# Patient Record
Sex: FEMALE | Race: WHITE | NOT HISPANIC OR LATINO
[De-identification: names, ages, dates, MRNs, and addresses within clinical notes are randomized per-mention and may not be internally consistent; named-entity substitution may affect disease eponyms.]

---

## 2018-10-29 ENCOUNTER — FORM ENCOUNTER (OUTPATIENT)
Age: 33
End: 2018-10-29

## 2019-04-14 ENCOUNTER — FORM ENCOUNTER (OUTPATIENT)
Age: 34
End: 2019-04-14

## 2019-11-07 ENCOUNTER — FORM ENCOUNTER (OUTPATIENT)
Age: 34
End: 2019-11-07

## 2020-11-09 ENCOUNTER — FORM ENCOUNTER (OUTPATIENT)
Age: 35
End: 2020-11-09

## 2021-05-03 ENCOUNTER — EMERGENCY (EMERGENCY)
Facility: HOSPITAL | Age: 36
LOS: 0 days | Discharge: HOME | End: 2021-05-03
Attending: EMERGENCY MEDICINE
Payer: COMMERCIAL

## 2021-05-03 VITALS
TEMPERATURE: 99 F | HEART RATE: 81 BPM | SYSTOLIC BLOOD PRESSURE: 146 MMHG | DIASTOLIC BLOOD PRESSURE: 96 MMHG | OXYGEN SATURATION: 100 % | RESPIRATION RATE: 18 BRPM

## 2021-05-03 VITALS — WEIGHT: 130.07 LBS | HEIGHT: 63 IN

## 2021-05-03 DIAGNOSIS — S66.221A LACERATION OF EXTENSOR MUSCLE, FASCIA AND TENDON OF RIGHT THUMB AT WRIST AND HAND LEVEL, INITIAL ENCOUNTER: ICD-10-CM
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
## 2021-05-03 PROCEDURE — 99285 EMERGENCY DEPT VISIT HI MDM: CPT | Mod: 25

## 2021-05-03 PROCEDURE — 26418 REPAIR FINGER TENDON: CPT

## 2021-05-03 PROCEDURE — 99284 EMERGENCY DEPT VISIT MOD MDM: CPT

## 2021-05-03 RX ORDER — CEPHALEXIN 500 MG
1 CAPSULE ORAL
Qty: 20 | Refills: 0
Start: 2021-05-03 | End: 2021-05-07

## 2021-05-03 RX ORDER — CEPHALEXIN 500 MG
500 CAPSULE ORAL ONCE
Refills: 0 | Status: COMPLETED | OUTPATIENT
Start: 2021-05-03 | End: 2021-05-03

## 2021-05-03 RX ADMIN — Medication 500 MILLIGRAM(S): at 19:32

## 2021-05-03 NOTE — ED STATDOCS - PROGRESS NOTE DETAILS
laceration repaired by Dr. Hicks at patient request.  She will follow up in the office -Clifford Jackson PA-C

## 2021-05-03 NOTE — ED STATDOCS - OBJECTIVE STATEMENT
36 y/o F with PMHx of ADHD on adarall presenting to the ED c/o R hand laceration x1 hour PTA. Patient was washing dishes when she cut her hand on a dish. Pt is RHD. Patient was seen at , sent patient to the ED to meet with Dr. Hicks. Tetanus shot is UTD. No other complaints at this time.

## 2021-05-03 NOTE — ED ADULT NURSE NOTE - OBJECTIVE STATEMENT
Pt comes to the ED complaining of laceration to right thumb. Pt sates that she was doing the dishes when she cut her hand.

## 2021-05-03 NOTE — ED STATDOCS - SKIN, MLM
skin normal color for race, warm, dry. +semicircular shaped laceration to R thumb around MCP 3cm in length.

## 2021-05-03 NOTE — ED STATDOCS - CARE PROVIDER_API CALL
Andrew Owens)  Plastic Surgery  [address], [address], [place] [address]  Phone: [phone]  Fax: [phone]  Follow Up Time:

## 2021-05-03 NOTE — ED STATDOCS - PATIENT PORTAL LINK FT
You can access the FollowMyHealth Patient Portal offered by [facility] by registering at the following website: [email or web address]. By joining [facility]’s FollowMyHealth portal, you will also be able to view your health information using other applications (apps) compatible with our system.

## 2021-10-18 PROBLEM — Z78.9 OTHER SPECIFIED HEALTH STATUS: Chronic | Status: ACTIVE | Noted: 2021-05-04

## 2021-11-01 ENCOUNTER — NON-APPOINTMENT (OUTPATIENT)
Age: 36
End: 2021-11-01

## 2021-11-01 DIAGNOSIS — R53.83 OTHER FATIGUE: ICD-10-CM

## 2021-11-01 DIAGNOSIS — R87.612 LOW GRADE SQUAMOUS INTRAEPITHELIAL LESION ON CYTOLOGIC SMEAR OF CERVIX (LGSIL): ICD-10-CM

## 2021-11-01 DIAGNOSIS — N76.0 ACUTE VAGINITIS: ICD-10-CM

## 2021-11-01 DIAGNOSIS — R87.810 CERVICAL HIGH RISK HUMAN PAPILLOMAVIRUS (HPV) DNA TEST POSITIVE: ICD-10-CM

## 2021-11-01 PROBLEM — Z00.00 ENCOUNTER FOR PREVENTIVE HEALTH EXAMINATION: Status: ACTIVE | Noted: 2021-11-01

## 2021-11-01 RX ORDER — DEXTROAMPHETAMINE SACCHARATE, AMPHETAMINE ASPARTATE, DEXTROAMPHETAMINE SULFATE, AND AMPHETAMINE SULFATE 7.5; 7.5; 7.5; 7.5 MG/1; MG/1; MG/1; MG/1
30 TABLET ORAL
Refills: 0 | Status: ACTIVE | COMMUNITY

## 2021-11-16 ENCOUNTER — APPOINTMENT (OUTPATIENT)
Dept: OBGYN | Facility: CLINIC | Age: 36
End: 2021-11-16

## 2022-11-15 ENCOUNTER — LABORATORY RESULT (OUTPATIENT)
Age: 37
End: 2022-11-15

## 2022-11-15 ENCOUNTER — APPOINTMENT (OUTPATIENT)
Dept: OBGYN | Facility: CLINIC | Age: 37
End: 2022-11-15

## 2022-11-15 ENCOUNTER — NON-APPOINTMENT (OUTPATIENT)
Age: 37
End: 2022-11-15

## 2022-11-15 VITALS
SYSTOLIC BLOOD PRESSURE: 122 MMHG | HEIGHT: 63 IN | DIASTOLIC BLOOD PRESSURE: 68 MMHG | WEIGHT: 130 LBS | BODY MASS INDEX: 23.04 KG/M2

## 2022-11-15 DIAGNOSIS — Z11.3 ENCOUNTER FOR SCREENING FOR INFECTIONS WITH A PREDOMINANTLY SEXUAL MODE OF TRANSMISSION: ICD-10-CM

## 2022-11-15 DIAGNOSIS — Z12.39 ENCOUNTER FOR OTHER SCREENING FOR MALIGNANT NEOPLASM OF BREAST: ICD-10-CM

## 2022-11-15 DIAGNOSIS — Z12.4 ENCOUNTER FOR SCREENING FOR MALIGNANT NEOPLASM OF CERVIX: ICD-10-CM

## 2022-11-15 PROCEDURE — 99395 PREV VISIT EST AGE 18-39: CPT

## 2022-11-15 NOTE — HISTORY OF PRESENT ILLNESS
[HIV test declined] : HIV test declined [Syphilis test declined] : Syphilis test declined [Gonorrhea test declined] : Gonorrhea test declined [Chlamydia test declined] : Chlamydia test declined [Trichomonas test declined] : Trichomonas test declined [Hepatitis B test declined] : Hepatitis B test declined [Hepatitis C test declined] : Hepatitis C test declined [Y] : Patient is sexually active [N] : Patient denies prior pregnancies [Normal Amount/Duration] :  normal amount and duration [Regular Cycle Intervals] : periods have been regular [Currently Active] : currently active [Men] : men [Vaginal] : vaginal [No] : No [FreeTextEntry1] : 36 y/o female here for annual examination. Pt stable regular menses moderate to light flow. No abnormal discharge or bleeding. No loss of urine or burning while urinating. No interest in birth control at this time. Pt denies pelvic pain and abnormal bloating. Normal bowel movements.  Patient is interested in egg retrieval and freezing we had a long discussion in terms of ovulation induction risk benefits pros and cons and she is agreeable to seeing Dr. Berlin Murillo for consultation and evaluation regarding this interest. [TextBox_4] : PT HERE FOR ANNUAL EXAM [Mammogramdate] : 11/8/19 [TextBox_19] : BR1 [PapSmeardate] : 11/10/20 [TextBox_31] : ASCUS HPV- [TextBox_78] : H/O HPV YEARS AGO  [LMPDate] : 10/30/22 [MensesFreq] : 28 [MensesLength] : 5 [TextBox_9] : 11 [TextBox_13] : 28 [TextBox_15] : 5

## 2022-11-15 NOTE — PLAN
[FreeTextEntry1] : Pap smear completed breast self-exam taught will start multivitamin with folic acid 1 mg daily patient will see Dr. Berlin Murillo for egg retrieval consultation mammogram scheduled patient will return in 12 months

## 2022-11-16 LAB
C TRACH RRNA SPEC QL NAA+PROBE: NOT DETECTED
HPV HIGH+LOW RISK DNA PNL CVX: DETECTED
N GONORRHOEA RRNA SPEC QL NAA+PROBE: NOT DETECTED
SOURCE TP AMPLIFICATION: NORMAL

## 2022-11-22 LAB — CYTOLOGY CVX/VAG DOC THIN PREP: NORMAL

## 2023-10-01 PROBLEM — R87.810 CERVICAL HIGH RISK HUMAN PAPILLOMAVIRUS (HPV) DNA TEST POSITIVE: Status: RESOLVED | Noted: 2021-11-01 | Resolved: 2023-10-01

## 2023-11-21 ENCOUNTER — APPOINTMENT (OUTPATIENT)
Dept: OBGYN | Facility: CLINIC | Age: 38
End: 2023-11-21